# Patient Record
Sex: FEMALE | Race: OTHER | Employment: FULL TIME | URBAN - METROPOLITAN AREA
[De-identification: names, ages, dates, MRNs, and addresses within clinical notes are randomized per-mention and may not be internally consistent; named-entity substitution may affect disease eponyms.]

---

## 2018-09-05 ENCOUNTER — HOSPITAL ENCOUNTER (OUTPATIENT)
Dept: GENERAL RADIOLOGY | Age: 48
Discharge: HOME | End: 2018-09-05
Payer: COMMERCIAL

## 2018-09-05 DIAGNOSIS — M25.551 RIGHT HIP PAIN: ICD-10-CM

## 2018-09-05 PROCEDURE — 73502 X-RAY EXAM HIP UNI 2-3 VIEWS: CPT

## 2019-04-18 ENCOUNTER — OFFICE VISIT (OUTPATIENT)
Dept: ORTHOPEDIC SURGERY | Age: 49
End: 2019-04-18

## 2019-04-18 VITALS
BODY MASS INDEX: 24.59 KG/M2 | DIASTOLIC BLOOD PRESSURE: 72 MMHG | OXYGEN SATURATION: 100 % | HEIGHT: 64 IN | WEIGHT: 144 LBS | TEMPERATURE: 97.8 F | SYSTOLIC BLOOD PRESSURE: 127 MMHG | HEART RATE: 81 BPM | RESPIRATION RATE: 15 BRPM

## 2019-04-18 DIAGNOSIS — S61.258A DOG BITE OF INDEX FINGER, INITIAL ENCOUNTER: Primary | ICD-10-CM
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
## 2019-04-18 RX ORDER — AMOXICILLIN AND CLAVULANATE POTASSIUM 875; 125 MG/1; MG/1
TABLET, FILM COATED ORAL EVERY 12 HOURS
COMMUNITY

## 2019-04-18 NOTE — LETTER
NOTIFICATION RETURN TO WORK / SCHOOL 
 
4/18/2019 1:32 PM 
 
Ms. Kings Anderson [facility] 
[address] [facility] To Whom It May Concern: Kings Anderson is currently under the care of [address] [place] [address] Froylan Hayes. Patient will be light duty for 1 week, followed by full duty after that. If there are questions or concerns please have the patient contact our office. Sincerely, Lopez Ortiz, DO

## 2019-04-18 NOTE — PROGRESS NOTES
1. Have you been to the ER, urgent care clinic since your last visit? Hospitalized since your last visit? No 
 
2. Have you seen or consulted any other health care providers outside of the [address] [place] [address] since your last visit? Include any pap smears or colon screening.  No

## 2019-04-18 NOTE — PROGRESS NOTES
Myrna Jankior is a 53 y.o. 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 Worker's Compensation and legal considerations: not known. Vitals:  
 04/18/19 1319 BP: 127/72 Pulse: 81 Resp: 15 Temp: 97.8 °F (36.6 °C) TempSrc: Oral  
SpO2: 100% Weight: 144 lb (65.3 kg) Height: 5' 4\" (1.626 m) PainSc:   0 - No pain PainLoc: Finger Chief Complaint Patient presents with  Finger Pain LEFT INDEX FINGER  
 
 
 
HPI: Patient comes in today 2 weeks after sustaining a dog bite while at work to her left index finger. [facility] and [facility] and had her wound closed with Steri-Strips and was given antibiotics. She has not missed work but has had light duty. Date of onset: 4/4/2019 Injury: Yes: Comment: Dog bite Prior Treatment:  Yes: Comment: [facility] 
 
Numbness/ Tingling: No 
 
ROS: Review of Systems - General ROS: negative Respiratory ROS: no cough, shortness of breath, or wheezing Cardiovascular ROS: no chest pain or dyspnea on exertion Musculoskeletal ROS: positive for - pain in finger - left Neurological ROS: negative Dermatological ROS: positive for - laceration secondary to dogbite History reviewed. No pertinent past medical history. History reviewed. No pertinent surgical history. Current Outpatient Medications Medication Sig Dispense Refill  amoxicillin-clavulanate (AUGMENTIN) 875-125 mg per tablet Take  by mouth every twelve (12) hours. No Known Allergies PE:  
 
Left Hand: Index finger laceration over the volar pulp has healed. There is mild tenderness to palpation. There is palpable scar tissue deep to the laceration. Cap refill is brisk sensation is intact distally. FDP and FDS are both intact. Imaging:  
 
Plain films done today of the left index finger does not show any acute osseous abnormality fracture dislocation. There are no foreign bodies present. ICD-10-CM ICD-9-CM 1. Dog bite of index finger, initial encounter S61.258A 883.0 AMB POC XRAY, FINGER(S), 2+ VIEWS  
 W54. 0XXA E906.0 Plan:  
 
Given resolution of symptoms to this point at 2 weeks of the left index finger the patient may return to full duty in 1 week. We will splint and occupational therapy order for range of motion edema control and scar control for her left index finger. Follow-up PRN Plan was reviewed with patient, who verbalized agreement and understanding of the plan
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
## 2019-04-23 DIAGNOSIS — S61.258A DOG BITE OF INDEX FINGER, INITIAL ENCOUNTER: Primary | ICD-10-CM
